# Patient Record
Sex: MALE
[De-identification: names, ages, dates, MRNs, and addresses within clinical notes are randomized per-mention and may not be internally consistent; named-entity substitution may affect disease eponyms.]

---

## 2020-06-11 ENCOUNTER — HOSPITAL ENCOUNTER (EMERGENCY)
Dept: HOSPITAL 5 - ED | Age: 55
Discharge: PSYCHIATRIC OR SUBSTANCE USE FACILITY | End: 2020-06-11
Payer: COMMERCIAL

## 2020-06-11 VITALS — DIASTOLIC BLOOD PRESSURE: 78 MMHG | SYSTOLIC BLOOD PRESSURE: 117 MMHG

## 2020-06-11 DIAGNOSIS — Z00.8: ICD-10-CM

## 2020-06-11 DIAGNOSIS — Z79.899: ICD-10-CM

## 2020-06-11 DIAGNOSIS — G40.909: Primary | ICD-10-CM

## 2020-06-11 LAB
BILIRUB UR QL STRIP: (no result)
BLOOD UR QL VISUAL: (no result)
BUN SERPL-MCNC: 8 MG/DL (ref 9–20)
BUN/CREAT SERPL: 9 %
CALCIUM SERPL-MCNC: 9 MG/DL (ref 8.4–10.2)
GRAN CASTS #/AREA URNS LPF: 20 /LPF
HCT VFR BLD CALC: 44.2 % (ref 35.5–45.6)
HEMOLYSIS INDEX: 10
HGB BLD-MCNC: 14.9 GM/DL (ref 11.8–15.2)
MCHC RBC AUTO-ENTMCNC: 34 % (ref 32–34)
MCV RBC AUTO: 89 FL (ref 84–94)
MUCOUS THREADS #/AREA URNS HPF: (no result) /HPF
PH UR STRIP: 6 [PH] (ref 5–7)
PLATELET # BLD: 198 K/MM3 (ref 140–440)
PROT UR STRIP-MCNC: (no result) MG/DL
RBC # BLD AUTO: 5 M/MM3 (ref 3.65–5.03)
RBC #/AREA URNS HPF: < 1 /HPF (ref 0–6)
UROBILINOGEN UR-MCNC: < 2 MG/DL (ref ?–2)
WBC #/AREA URNS HPF: 1 /HPF (ref 0–6)

## 2020-06-11 PROCEDURE — 99285 EMERGENCY DEPT VISIT HI MDM: CPT

## 2020-06-11 PROCEDURE — 84443 ASSAY THYROID STIM HORMONE: CPT

## 2020-06-11 PROCEDURE — 70450 CT HEAD/BRAIN W/O DYE: CPT

## 2020-06-11 PROCEDURE — 36415 COLL VENOUS BLD VENIPUNCTURE: CPT

## 2020-06-11 PROCEDURE — 80320 DRUG SCREEN QUANTALCOHOLS: CPT

## 2020-06-11 PROCEDURE — 80048 BASIC METABOLIC PNL TOTAL CA: CPT

## 2020-06-11 PROCEDURE — 82550 ASSAY OF CK (CPK): CPT

## 2020-06-11 PROCEDURE — 93005 ELECTROCARDIOGRAM TRACING: CPT

## 2020-06-11 PROCEDURE — 81001 URINALYSIS AUTO W/SCOPE: CPT

## 2020-06-11 PROCEDURE — 96365 THER/PROPH/DIAG IV INF INIT: CPT

## 2020-06-11 PROCEDURE — 96366 THER/PROPH/DIAG IV INF ADDON: CPT

## 2020-06-11 PROCEDURE — 83735 ASSAY OF MAGNESIUM: CPT

## 2020-06-11 PROCEDURE — 96375 TX/PRO/DX INJ NEW DRUG ADDON: CPT

## 2020-06-11 PROCEDURE — 85027 COMPLETE CBC AUTOMATED: CPT

## 2020-06-11 PROCEDURE — G0480 DRUG TEST DEF 1-7 CLASSES: HCPCS

## 2020-06-11 NOTE — EMERGENCY DEPARTMENT REPORT
ED Seizure HPI





- General


Chief Complaint: Seizure


Stated Complaint: SEIZURE


Time Seen by Provider: 20 11:22


Source: patient, EMS ( EMS documentation not available at time of chart 

dictation ), RN notes reviewed


Mode of arrival: Stretcher


Limitations: Other (Patient is a poor historian)





- History of Present Illness


Initial Comments: 





The patient is a 54-year-old gentleman.  He is not known to myself previously.  

He is currently on a 1013.  The patient has a history of possible high 

cholesterol.





He does not have a formal diagnosis of epilepsy.  Apparently, detoxing from 

Ativan and or Ambien.





He is accompanied by a psychiatric sitter.





He presents to the ER with a complaint of convulsions and possible seizure.  The

patient states his last convulsion was approximately 6 months ago.  He has 

chronic back pain, but otherwise, denies physical pain.  There is no complaint 

of headache, neck pain, chest pain, abdominal pain, shortness of breath, 

irritative/obstructive urinary symptoms.





The patient states he is from [facility], but is currently 

/ from his wife, and does not know where he is going to live 

once he is discharged from his psychiatric facility.  He states "I guess I am 

just going to be a matias of the [facility]."  He does not endorse homicidality or 

suicidality at this time.


In the emergency room, he was initially conversing with nursing team, then, when

I went in to speak to him, he was awake, but shaking.  His nonspecific shaking 

episode was terminated with Valium.








MD Complaint: possible seizure


-: This morning


Description of Episode: loss of consciousness, tonic-clonic movement


-: second(s)


Witnessed:: Yes


Trauma: No


Seizure History: other


Place: other (Psychiatric facility)


Possible Precipitating Event: other (Conversion disorder versus alcohol 

withdrawal)





- Related Data


                                Home Medications











 Medication  Instructions  Recorded  Confirmed  Last Taken


 


Mirtazapine [Remeron 15mg TAB] 15 mg PO QHS 20 Unknown


 


Multivitamin with Iron 1 each PO DAILY 20 Unknown





[Multivitamins with Iron]    


 


Rosuvastatin (Nf) [Crestor] 10 mg PO QHS 20


 


traZODone [Desyrel] 100 mg PO QHS 06/11/20 06/11/20 06/10/20








                                  Previous Rx's











 Medication  Instructions  Recorded  Last Taken  Type


 


levETIRAcetam [Keppra TAB] 500 mg PO BID #60 tablet 20 Unknown Rx











                                    Allergies











Allergy/AdvReac Type Severity Reaction Status Date / Time


 


No Known Allergies Allergy   Unverified 20 11:31














ED Review of Systems


ROS: 


Stated complaint: SEIZURE


Other details as noted in HPI





Constitutional: denies: fever


Eyes: denies: eye discharge


ENT: denies: congestion


Respiratory: denies: cough


Cardiovascular: denies: chest pain


Genitourinary: denies: dysuria


Musculoskeletal: back pain (Chronic back pain)


Neurological: denies: weakness


Psychiatric: anxiety, depression





ED Past Medical Hx





- Medications


Home Medications: 


                                Home Medications











 Medication  Instructions  Recorded  Confirmed  Last Taken  Type


 


Mirtazapine [Remeron 15mg TAB] 15 mg PO QHS 20 Unknown History


 


Multivitamin with Iron 1 each PO DAILY 20 Unknown History





[Multivitamins with Iron]     


 


Rosuvastatin (Nf) [Crestor] 10 mg PO QHS 20 History


 


levETIRAcetam [Keppra TAB] 500 mg PO BID #60 tablet 20  Unknown Rx


 


traZODone [Desyrel] 100 mg PO QHS 06/11/20 06/11/20 06/10/20 History














ED Physical Exam





- General


Limitations: No Limitations


General appearance: alert, in no apparent distress





- Head


Head exam: Present: atraumatic, normocephalic





- Eye


Eye exam: Present: normal appearance, PERRL, EOMI, other (Visual acuity intact 

to finger counting, color perception, reading at a close distance).  Absent: 

nystagmus





- ENT


ENT exam: Present: normal exam, normal orophraynx, mucous membranes moist, 

normal external ear exam





- Neck


Neck exam: Present: normal inspection, full ROM.  Absent: tenderness, 

meningismus





- Respiratory


Respiratory exam: Present: normal lung sounds bilaterally.  Absent: respiratory 

distress





- Cardiovascular


Cardiovascular Exam: Present: regular rate, normal rhythm, normal heart sounds. 

 Absent: bradycardia, tachycardia, irregular rhythm, systolic murmur, diastolic 

murmur, rubs, gallop





- GI/Abdominal


GI/Abdominal exam: Present: soft.  Absent: distended, tenderness, guarding, 

rebound, rigid, pulsatile mass





- Rectal


Rectal exam: Present: deferred





- Extremities Exam


Extremities exam: Present: normal inspection, full ROM, other (2+ pulses noted 

in the bilateral upper and lower extremities.  There is no palpable cord.   

negative Homans sign.  Muscular compartments are soft.  The pelvis is stable.). 

 Absent: pedal edema, calf tenderness





- Back Exam


Back exam: Present: normal inspection, full ROM.  Absent: tenderness, CVA 

tenderness (R), CVA tenderness (L), paraspinal tenderness, vertebral tenderness





- Neurological Exam


Neurological exam: Present: alert, oriented X3, other (No facial droop.  Tongue 

midline.  Extraocular movements intact bilaterally.  Facial sensation intact to 

light touch in V1, V2, V3 distribution bilaterally.  5 and a 5 strength in 4 

extremities.  Sensation intact to light touch in 4 extremities.).  Absent: motor

 sensory deficit





- Psychiatric


Psychiatric exam: Present: flat affect





- Skin


Skin exam: Present: warm, dry, intact, normal color.  Absent: rash





ED Course


                                   Vital Signs











  20





  11:15 11:30 12:00


 


Temperature 98.9 F  


 


Pulse Rate 78  87


 


Respiratory 20 20 18





Rate   


 


Blood Pressure 134/87  


 


Blood Pressure   122/84





[Right]   


 


O2 Sat by Pulse 94 94 95





Oximetry   














  20





  12:30 13:00 14:00


 


Temperature   


 


Pulse Rate 82 85 82


 


Respiratory 18 18 19





Rate   


 


Blood Pressure   


 


Blood Pressure 115/64 126/80 130/77





[Right]   


 


O2 Sat by Pulse 94 97 95





Oximetry   














- Reevaluation(s)


Reevaluation #1: 





20 14:06


Differential diagnosis, including but not limited to: Seizure, pseudoseizure, 

intracranial lesion, electrolyte derangement, urinary tract infection, psych

ogenic seizure





Assessment and plan: 54-year-old gentleman, who is currently afebrile, with 

reassuring vital signs, clinically sober at this time, with a GCS of 15, 

nonfocal motor exam, with resolved nonspecific convulsions.  Suspect psychogenic

 event.  Screening laboratory studies thus far unremarkable.  After initial 

medication, no further convulsive events noted.  Screening laboratory studies w

ithin normal limits, serum toxicology studies pending at this time.  CT scan of 

the brain is negative, urinalysis negative for acute findings.  No focal 

pulmonary findings, do not clinically suspect pneumonia.





Patient will need to follow-up with an outpatient neurologist, primary care 

doctor once medically cleared in the emergency room.


20 14:08





Reevaluation #2: 





20 15:17


Patient is observed for hours without clinical decompensation.  Objective 

imaging studies and laboratory studies unremarkable for acute pathology.  

Counseled to follow-up as an outpatient once discharged from his current 

psychiatric facility.





ED Medical Decision Making





- Lab Data


Result diagrams: 


                                 20 11:30





                                 20 11:30








                                   Vital Signs











  20





  11:15 11:30


 


Temperature 98.9 F 


 


Pulse Rate 78 


 


Respiratory 20 20





Rate  


 


Blood Pressure 134/87 


 


O2 Sat by Pulse 94 94





Oximetry  











                                   Lab Results











  20 Range/Units





  11:30 11:30 


 


WBC  7.0   (4.5-11.0)  K/mm3


 


RBC  5.00   (3.65-5.03)  M/mm3


 


Hgb  14.9   (11.8-15.2)  gm/dl


 


Hct  44.2   (35.5-45.6)  %


 


MCV  89   (84-94)  fl


 


MCH  30   (28-32)  pg


 


MCHC  34   (32-34)  %


 


RDW  14.0   (13.2-15.2)  %


 


Plt Count  198   (140-440)  K/mm3


 


Sodium   143  (137-145)  mmol/L


 


Potassium   3.6  (3.6-5.0)  mmol/L


 


Chloride   107.3 H  ()  mmol/L


 


Carbon Dioxide   22  (22-30)  mmol/L


 


Anion Gap   17  mmol/L


 


BUN   8 L  (9-20)  mg/dL


 


Creatinine   0.9  (0.8-1.5)  mg/dL


 


Estimated GFR   > 60  ml/min


 


BUN/Creatinine Ratio   9  %


 


Glucose   138 H  ()  mg/dL


 


Calcium   9.0  (8.4-10.2)  mg/dL


 


Magnesium   2.30  (1.7-2.3)  mg/dL


 


Total Creatine Kinase   643 H  ()  units/L














- EKG Data


-: EKG Interpreted by Me


EKG shows normal: sinus rhythm


Rate: normal





- EKG Data


When compared to previous EKG there are: previous EKG unavailable





20 12:28


There is no prior EKG available for comparison.  Sinus rhythm, 77 bpm, normal 

axis, QTC within normal limits, T wave abnormalities in the septal and lateral 

leads, there is no endorsement of chest pain, the EKG is abnormal, the EKG is 

not a STEMI





- Radiology Data


Radiology results: report reviewed, image reviewed





Print Report











Referring Physician: JONATHAN NUNEZ 


 


Patient Name: WENDY HENDRICKSON 


 


Patient ID: [id number] 


 


YOB: 1965 


 


Sex: Male 


 


Accession: [id number] 


 


Report Date: 2020 


 


Report Status: Finalized 


 


Findings


[facility] [address] [place] [address] Cat

 Scan Report Signed Patient: WENDY HENDRICKSON MR#: [id number] : 1965 

Acct:[id number] Age/Sex: 54 / M ADM Date: 20 Loc: ED Attending Dr: 

Ordering Physician: JONATHAN NUNEZ MD Date of Service: 20 Procedure(s):

 CT head/brain wo con Accession Number(s): [id number] cc: JONATHAN NUNEZ MD 

NONENHANCED CT SCAN OF THE HEAD: INDICATION / CLINICAL INFORMATION: 54 years 

Male; seizure history of confusion. TECHNIQUE: Routine CT head without contrast.

 All CT scans at this location are performed using CT dose reduction for ALARA 

by means of automated exposure control. COMPARISON: None. FINDINGS: BRAIN / 

INTRACRANIAL CONTENTS: No acute hemorrhage, mass effect, midline shift, 

hydrocephalus, or acute, large territorial infarct. No chronic infarct or focal 

atrophy. Normal brain volume and ventricular/sulcal size for age. No significant

 white matter abnormality. Temporal lobes are normal. CRANIOCERVICAL JUNCTION: 

No significant abnormality. ORBITS: No significant abnormality of visualized 

orbits. SINUSES / MASTOIDS: No significant abnormality of the visualized 

paranasal sinuses or mastoid air cells. ADDITIONAL FINDINGS: None. IMPRESSION: 

No focal parenchymal lesion in the brain Signer Name: Gaye Sadler MD Signed: 2020 12:13 PM Workstation Name: [id number] Transcribed By: BS 

Dictated By: Gaye Wagner MD Electronically Authenticated By: 

Gaye Wagner MD Signed Date/Time: 20 1213 DD/DT: 20 1211  

 











Critical care attestation.: 


If time is entered above; I have spent that time in minutes in the direct care 

of this critically ill patient, excluding procedure time.








ED Disposition


Clinical Impression: 


 History of convulsions, Medical clearance for psychiatric admission





Disposition: DC/TX-65 PSY HOSP/PSY UNIT


Is pt being admited?: No


Does the pt Need Aspirin: No


Condition: Stable


Additional Instructions: 


Do not drive or operate motor vehicles for the next 6 months, or until cleared 

to do so by a primary care doctor.





Take the medication as prescribed.





Follow-up with a primary care doctor or neurologist within the next 7 days.





Avoid consumption of alcohol, and sedating medications.





Return to the emergency room right away with new pain, worsening pain, migration

 of pain, projectile vomiting, change in mental status, confusion, inability to 

tolerate liquid feeds, new, worsened or different symptoms not present on the 

initial emergency room evaluation.


Prescriptions: 


levETIRAcetam [Keppra TAB] 500 mg PO BID #60 tablet


Referrals: 


GABRIEL BENSON MD [Referring] - 3-5 Days


CATHERINE JUAREZ MD [Staff Physician] - 3-5 Days


ABI EASTMAN MD [Staff Physician] - 3-5 Days

## 2020-06-11 NOTE — CAT SCAN REPORT
NONENHANCED CT SCAN OF THE HEAD: 



INDICATION / CLINICAL INFORMATION:

54 years Male; seizure history of confusion.



TECHNIQUE: Routine CT head without contrast. All CT scans at this location are performed using CT dos
e reduction for ALARA by means of automated exposure control. 



COMPARISON: 

None.



FINDINGS:



BRAIN / INTRACRANIAL CONTENTS:  No acute hemorrhage, mass effect, midline shift, hydrocephalus, or ac
[place], large territorial infarct. No chronic infarct or focal atrophy. Normal brain volume and ventricu
lar/sulcal size for age. No significant white matter abnormality. Temporal lobes are normal.



CRANIOCERVICAL JUNCTION: No significant abnormality.



ORBITS: No significant abnormality of visualized orbits.



SINUSES / MASTOIDS: No significant abnormality of the visualized paranasal sinuses or mastoid air narda
ls.



ADDITIONAL FINDINGS: None. 



IMPRESSION:

 No focal parenchymal lesion in the brain 



Signer Name: Gaye Sadler MD 

Signed: 6/11/2020 12:13 PM

Workstation Name: [id number]